# Patient Record
Sex: MALE | Race: WHITE | NOT HISPANIC OR LATINO | URBAN - METROPOLITAN AREA
[De-identification: names, ages, dates, MRNs, and addresses within clinical notes are randomized per-mention and may not be internally consistent; named-entity substitution may affect disease eponyms.]

---

## 2021-08-30 PROCEDURE — U0004 COV-19 TEST NON-CDC HGH THRU: HCPCS | Performed by: FAMILY MEDICINE

## 2021-09-15 PROCEDURE — U0004 COV-19 TEST NON-CDC HGH THRU: HCPCS | Performed by: FAMILY MEDICINE

## 2023-08-24 ENCOUNTER — PATIENT ROUNDING (BHMG ONLY) (OUTPATIENT)
Dept: FAMILY MEDICINE CLINIC | Facility: CLINIC | Age: 23
End: 2023-08-24
Payer: COMMERCIAL

## 2023-08-24 ENCOUNTER — OFFICE VISIT (OUTPATIENT)
Dept: FAMILY MEDICINE CLINIC | Facility: CLINIC | Age: 23
End: 2023-08-24
Payer: COMMERCIAL

## 2023-08-24 VITALS
DIASTOLIC BLOOD PRESSURE: 76 MMHG | SYSTOLIC BLOOD PRESSURE: 118 MMHG | OXYGEN SATURATION: 98 % | HEART RATE: 83 BPM | BODY MASS INDEX: 22.02 KG/M2 | HEIGHT: 72 IN | WEIGHT: 162.6 LBS

## 2023-08-24 DIAGNOSIS — K21.9 GASTROESOPHAGEAL REFLUX DISEASE, UNSPECIFIED WHETHER ESOPHAGITIS PRESENT: Primary | ICD-10-CM

## 2023-08-24 PROBLEM — S63.054A CMC (CARPOMETACARPAL JOINT) DISLOCATION, RIGHT, INITIAL ENCOUNTER: Status: ACTIVE | Noted: 2020-10-19

## 2023-08-24 PROBLEM — R10.12 LEFT UPPER QUADRANT ABDOMINAL PAIN: Status: ACTIVE | Noted: 2023-06-20

## 2023-08-24 PROBLEM — F19.20: Status: ACTIVE | Noted: 2023-06-20

## 2023-08-24 PROBLEM — R10.9 FLANK PAIN: Status: ACTIVE | Noted: 2023-06-20

## 2023-08-24 PROBLEM — R03.0 ELEVATED BP WITHOUT DIAGNOSIS OF HYPERTENSION: Status: ACTIVE | Noted: 2023-06-20

## 2023-08-24 PROCEDURE — 99214 OFFICE O/P EST MOD 30 MIN: CPT | Performed by: INTERNAL MEDICINE

## 2023-08-24 RX ORDER — FAMOTIDINE 20 MG/1
TABLET, FILM COATED ORAL
COMMUNITY
Start: 2023-06-19

## 2023-08-24 RX ORDER — SENNOSIDES 8.6 MG
CAPSULE ORAL
COMMUNITY

## 2023-08-24 RX ORDER — POLYETHYLENE GLYCOL 3350 17 G/17G
POWDER, FOR SOLUTION ORAL
COMMUNITY
Start: 2023-06-19

## 2023-08-24 RX ORDER — PANTOPRAZOLE SODIUM 40 MG/1
40 TABLET, DELAYED RELEASE ORAL DAILY
Qty: 90 TABLET | Refills: 2 | Status: SHIPPED | OUTPATIENT
Start: 2023-08-24

## 2023-08-24 RX ORDER — UREA 10 %
1 LOTION (ML) TOPICAL DAILY
COMMUNITY

## 2023-08-24 NOTE — PROGRESS NOTES
"Lance Loyola  2000  [id number]  Patient Care Team:  Hitesh Brandon MD as PCP - General (Internal Medicine)    Lance Loyola is a 22 y.o. male here today to establish care.  This patient is accompanied by their self who contributes to the history of their care.    Chief Complaint:    Chief Complaint   Patient presents with    Heartburn        History of Present Illness:   He has been having severe \"gerd\" episode last night and this am-. He takes prilosec 20 mg daily tums /pepcid prn. There is no dysphagia. Presented to Er at  in June. Was referred fro upper endo- missed appt.   He has chronic constipation as well. He describes burning, sharp pain in mid chest/upper abdomen pain. HA snot attended to diet modicfifation. Denies fevers + chill with pain. No weightg loss.    Past Medical History:   Diagnosis Date    GERD (gastroesophageal reflux disease)     Seasonal allergies        Past Surgical History:   Procedure Laterality Date    FRACTURE SURGERY      KNEE ARTHROPLASTY          Family History   Problem Relation Age of Onset    Cancer Mother         NHL    Hyperlipidemia Father     Hypertension Father     Heart disease Neg Hx     Stroke Neg Hx        Social History     Socioeconomic History    Marital status: Single   Tobacco Use    Smoking status: Every Day     Packs/day: 0.50     Years: 1.00     Pack years: 0.50     Types: Cigarettes    Smokeless tobacco: Never   Vaping Use    Vaping Use: Former    Substances: Nicotine   Substance and Sexual Activity    Alcohol use: Yes     Comment: occasion- perv 15-20 drinks/weekend    Drug use: Never     Comment: has cut cocaine use. ketamine binge    Sexual activity: Not Currently       No Known Allergies    Review of Systems:    Review of Systems   Constitutional: Negative.    HENT: Negative.     Gastrointestinal:  Positive for abdominal pain and GERD.   Endocrine: Negative.    Genitourinary: Negative.    Musculoskeletal: Negative.    Neurological: Negative.  " "  Hematological: Negative.      Vitals:    08/24/23 1329   BP: 118/76   Pulse: 83   SpO2: 98%   Weight: 73.8 kg (162 lb 9.6 oz)   Height: 182.9 cm (72\")     Body mass index is 22.05 kg/mý.      Current Outpatient Medications:     calcium carbonate (OS-JEFF) 1250 (500 Ca) MG chewable tablet, Chew 1 tablet Daily., Disp: , Rfl:     cetirizine (zyrTEC) 10 MG tablet, Take 1 tablet by mouth Daily., Disp: , Rfl:     famotidine (PEPCID) 20 MG tablet, , Disp: , Rfl:     polyethylene glycol 17 g packet, , Disp: , Rfl:     Sennosides (Senna) 8.6 MG capsule, Take  by mouth., Disp: , Rfl:     pantoprazole (Protonix) 40 MG EC tablet, Take 1 tablet by mouth Daily., Disp: 90 tablet, Rfl: 2    Physical Exam:    Physical Exam  Vitals and nursing note reviewed.   Constitutional:       General: He is not in acute distress.     Appearance: He is well-developed. He is not diaphoretic.   HENT:      Head: Normocephalic and atraumatic.      Right Ear: External ear normal.      Left Ear: External ear normal.      Mouth/Throat:      Pharynx: No oropharyngeal exudate.   Eyes:      General: No scleral icterus.        Right eye: No discharge.      Conjunctiva/sclera: Conjunctivae normal.   Neck:      Thyroid: No thyromegaly.      Vascular: No JVD.      Trachea: No tracheal deviation.   Cardiovascular:      Rate and Rhythm: Normal rate and regular rhythm.      Heart sounds: Normal heart sounds.      Comments: PMI nondisplaced  Pulmonary:      Effort: Pulmonary effort is normal.      Breath sounds: Normal breath sounds. No wheezing or rales.   Abdominal:      General: Bowel sounds are normal. There is no distension.      Palpations: Abdomen is soft.      Tenderness: There is no abdominal tenderness. There is no guarding or rebound.      Comments: Gastric tenderness to palpation.   Musculoskeletal:      Cervical back: Normal range of motion and neck supple.   Lymphadenopathy:      Cervical: No cervical adenopathy.   Skin:     General: Skin is warm " and dry.      Capillary Refill: Capillary refill takes less than 2 seconds.      Coloration: Skin is not pale.      Findings: No rash.   Neurological:      Mental Status: He is alert and oriented to person, place, and time.      Motor: No abnormal muscle tone.      Coordination: Coordination normal.   Psychiatric:         Mood and Affect: Mood normal.         Behavior: Behavior normal.         Judgment: Judgment normal.       Procedures    Results Review:    I reviewed the patient's new clinical results.  Responded the ER visits and primary care visit at [facility] for left upper quadrant pain after polysubstance binge use of multiple substances including MDMA, alcohol and cocaine    Assessment/Plan:    Problem List Items Addressed This Visit          Gastrointestinal Abdominal     Gastroesophageal reflux disease - Primary    Current Assessment & Plan     I question whether he may have eosinophilic esophagitis.  Recommended referral to GI for an upper endoscopy.  I changed him tonics 40 mg daily and Pepcid 20 mg at bedtime.  Continue Tums as needed.  Recommend elevation of the bed 8 inches, no eating 2 hours before lying supine.         Relevant Medications    famotidine (PEPCID) 20 MG tablet    pantoprazole (Protonix) 40 MG EC tablet    Other Relevant Orders    Ambulatory Referral to Gastroenterology       Plan of care reviewed with patient at the conclusion of today's visit. Education was provided regarding diagnosis and management.  Patient verbalizes understanding of and agreement with management plan.    Return in about 6 months (around 2/24/2024) for Annual.    Hitesh Brandon MD      Please note than portions of this note were completed wt a Voice Recognition Program

## 2023-08-24 NOTE — ASSESSMENT & PLAN NOTE
I question whether he may have eosinophilic esophagitis.  Recommended referral to GI for an upper endoscopy.  I changed him tonics 40 mg daily and Pepcid 20 mg at bedtime.  Continue Tums as needed.  Recommend elevation of the bed 8 inches, no eating 2 hours before lying supine.

## 2023-10-17 ENCOUNTER — TELEPHONE (OUTPATIENT)
Dept: GASTROENTEROLOGY | Facility: CLINIC | Age: 23
End: 2023-10-17

## 2023-10-17 NOTE — TELEPHONE ENCOUNTER
Caller: Lance Loyola    Relationship to patient: Self    Best call back number: [phone]     Chief complaint: SCHEDULING CONFLICT--[place]    Type of visit: PROCEDURE    Requested date: ANY TIME AFTER NOV 7th     If rescheduling, when is the original appointment: 11/6/23

## 2023-12-19 ENCOUNTER — OUTSIDE FACILITY SERVICE (OUTPATIENT)
Dept: GASTROENTEROLOGY | Facility: CLINIC | Age: 23
End: 2023-12-19
Payer: COMMERCIAL

## 2023-12-19 PROCEDURE — 88305 TISSUE EXAM BY PATHOLOGIST: CPT

## 2023-12-19 PROCEDURE — 88342 IMHCHEM/IMCYTCHM 1ST ANTB: CPT

## 2023-12-20 ENCOUNTER — LAB REQUISITION (OUTPATIENT)
Dept: LAB | Facility: HOSPITAL | Age: 23
End: 2023-12-20
Payer: COMMERCIAL

## 2023-12-20 DIAGNOSIS — K29.70 GASTRITIS, UNSPECIFIED, WITHOUT BLEEDING: ICD-10-CM

## 2023-12-20 DIAGNOSIS — K21.9 GASTRO-ESOPHAGEAL REFLUX DISEASE WITHOUT ESOPHAGITIS: ICD-10-CM

## 2023-12-20 DIAGNOSIS — R07.89 OTHER CHEST PAIN: ICD-10-CM

## 2023-12-26 LAB — REF LAB TEST METHOD: NORMAL
